# Patient Record
Sex: FEMALE | Race: OTHER | URBAN - METROPOLITAN AREA
[De-identification: names, ages, dates, MRNs, and addresses within clinical notes are randomized per-mention and may not be internally consistent; named-entity substitution may affect disease eponyms.]

---

## 2023-11-16 ENCOUNTER — OFFICE VISIT (OUTPATIENT)
Dept: OPHTHALMOLOGY | Facility: CLINIC | Age: 43
End: 2023-11-16

## 2023-11-16 ENCOUNTER — PHARMACY VISIT (OUTPATIENT)
Dept: PHARMACY | Facility: CLINIC | Age: 43
End: 2023-11-16

## 2023-11-16 DIAGNOSIS — H51.9 CONVERGENCE INSUFFICIENCY OR PALSY IN BINOCULAR EYE MOVEMENT: ICD-10-CM

## 2023-11-16 DIAGNOSIS — H53.19 OSCILLOPSIA: ICD-10-CM

## 2023-11-16 DIAGNOSIS — G44.40 MEDICATION OVERUSE HEADACHE: ICD-10-CM

## 2023-11-16 DIAGNOSIS — G43.719 INTRACTABLE CHRONIC MIGRAINE WITHOUT AURA AND WITHOUT STATUS MIGRAINOSUS: Primary | ICD-10-CM

## 2023-11-16 PROCEDURE — 99205 OFFICE O/P NEW HI 60 MIN: CPT | Performed by: PSYCHIATRY & NEUROLOGY

## 2023-11-16 RX ORDER — TIMOLOL MALEATE 2.5 MG/ML
1 SOLUTION/ DROPS OPHTHALMIC 2 TIMES DAILY
Qty: 5 ML | Refills: 11 | Status: SHIPPED | OUTPATIENT
Start: 2023-11-16 | End: 2024-11-15

## 2023-11-16 ASSESSMENT — SLIT LAMP EXAM - LIDS
COMMENTS: NORMAL
COMMENTS: NORMAL

## 2023-11-16 ASSESSMENT — CONF VISUAL FIELD
OD_SUPERIOR_NASAL_RESTRICTION: 0
OS_NORMAL: 1
OD_INFERIOR_TEMPORAL_RESTRICTION: 0
OS_INFERIOR_NASAL_RESTRICTION: 0
OS_INFERIOR_TEMPORAL_RESTRICTION: 0
OD_SUPERIOR_TEMPORAL_RESTRICTION: 0
OD_NORMAL: 1
OD_INFERIOR_NASAL_RESTRICTION: 0
OS_SUPERIOR_TEMPORAL_RESTRICTION: 0
OS_SUPERIOR_NASAL_RESTRICTION: 0

## 2023-11-16 ASSESSMENT — ENCOUNTER SYMPTOMS
MUSCULOSKELETAL NEGATIVE: 0
CARDIOVASCULAR NEGATIVE: 0
ALLERGIC/IMMUNOLOGIC NEGATIVE: 0
RESPIRATORY NEGATIVE: 0
ENDOCRINE NEGATIVE: 0
PSYCHIATRIC NEGATIVE: 0
CONSTITUTIONAL NEGATIVE: 0
EYES NEGATIVE: 1
NEUROLOGICAL NEGATIVE: 0
HEMATOLOGIC/LYMPHATIC NEGATIVE: 0
GASTROINTESTINAL NEGATIVE: 0

## 2023-11-16 ASSESSMENT — TONOMETRY
OS_IOP_MMHG: 14
IOP_METHOD: TONOPEN
OD_IOP_MMHG: 13

## 2023-11-16 ASSESSMENT — VISUAL ACUITY
OD_PH_CC: 20/20-2
OS_PH_CC: 20/25-1
OD_CC: 20/30-2
METHOD: SNELLEN - LINEAR
OS_CC: 20/30-2

## 2023-11-16 ASSESSMENT — EXTERNAL EXAM - RIGHT EYE: OD_EXAM: NORMAL

## 2023-11-16 ASSESSMENT — EXTERNAL EXAM - LEFT EYE: OS_EXAM: NORMAL

## 2023-11-16 NOTE — PROGRESS NOTES
Assessment and Plan    This 43 year-old woman with a history of suspected Hashimoto thyroiditis presents for evaluation of vision problems..    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 Imaging should exclude a compressive lesion such as a vascular loop. Treatment with topical timolol is low risk and reasonable with consideration of treatments such as gabapentin or carbamazepine if refractory.    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 I recommend plus power for near. Prism can be considered, but often is not helpful.    The worst problem is intractable migraine, likely with medication overuse headache. Cessation of APAP and other analgesics is important. Given long duration and complexity, consultation with a headache neurologist is my recommendation.    Plan    Plus power.  Obtain prior MRI for review. Consider MRI brain with contrast & MR angriogram of the head.  Trial of timolol eye drops 0.25% both eyes twice daily.  Headache neurology consultation.    Follow up depending on course.